# Patient Record
Sex: FEMALE | Race: WHITE | NOT HISPANIC OR LATINO | Employment: FULL TIME | URBAN - METROPOLITAN AREA
[De-identification: names, ages, dates, MRNs, and addresses within clinical notes are randomized per-mention and may not be internally consistent; named-entity substitution may affect disease eponyms.]

---

## 2020-08-11 ENCOUNTER — TELEPHONE (OUTPATIENT)
Dept: HEMATOLOGY ONCOLOGY | Facility: CLINIC | Age: 50
End: 2020-08-11

## 2020-08-11 NOTE — TELEPHONE ENCOUNTER
New Patient Encounter    New Patient Intake Form   Patient Details:  Aurea Avila  1970  [id number]    Background Information:  [address] starts by opening a telephone encounter and gathering the following information   Who is calling to schedule? If not self, relationship to patient? self   Referring Provider Ramiro Pump   What is the diagnosis? DCIS multiple sites   Is this diagnosis confirmed? Yes   When was the diagnosis? 6/2020   Is there a confirmed diagnosis from a biopsy/tissue reviewed by pathology? yes   Is patient aware of diagnosis? Yes   Is there a personal history and what kind? no   Is there a family history and what kind? Colon, esophageal    Reason for visit? New Diagnosis   Have you had any imaging or labs done? If so: when, where? yes  [facility]   Are records in [facility]? No / will be scanned   Was the patient told to bring a disk? yes   Does the patient smoke or Vape? no   If yes, how many packs or cartridges per day? Scheduling Information:   Preferred [place]:  Any     Are there any dates/time the patient cannot be seen? Miscellaneous: SLIDES REQUESTED  Pt will bring CD  Pt did have genetic testing that was negative  Called for report  Pt would like Dr Jeffrey Mccarthy for PS, referred by a friend   After completing the above information, please route to Financial Counselor and the appropriate Nurse Navigator for review

## 2020-08-13 NOTE — TELEPHONE ENCOUNTER
Pt has active [employer] ins effective 01/01/16  Marysol Chow pcp co-pay $20 specialist co-pay $40  called pt & went over the co-pays  & told her after her appt I will get the plan & verify the specific benefits that will pertain to the plan & call her back    gave her my contact info & she thanked me,,

## 2020-08-20 PROBLEM — D05.12 DUCTAL CARCINOMA IN SITU (DCIS) OF LEFT BREAST: Status: ACTIVE | Noted: 2020-08-20

## 2020-08-21 ENCOUNTER — TELEPHONE (OUTPATIENT)
Dept: SURGICAL ONCOLOGY | Facility: CLINIC | Age: 50
End: 2020-08-21

## 2020-08-21 NOTE — TELEPHONE ENCOUNTER
Patient is not currently experiencing any symptoms of fever, cough, shortness of breath, chills, repeated shaking with chills, muscle pain, headache, sore throat, or new loss of taste/smell  Patient has  been tested for COVID-19  SCREENING DONE PM  Patient has not been in contact with someone confirmed to have COVID-19  Reviewed visitor policy with patient  Reviewed masking policy with patient  Instructed patient to call office if any symptoms develop between now and appointment time

## 2020-08-24 ENCOUNTER — CONSULT (OUTPATIENT)
Dept: SURGICAL ONCOLOGY | Facility: CLINIC | Age: 50
End: 2020-08-24
Payer: COMMERCIAL

## 2020-08-24 VITALS
HEIGHT: 64 IN | WEIGHT: 178 LBS | DIASTOLIC BLOOD PRESSURE: 98 MMHG | HEART RATE: 90 BPM | SYSTOLIC BLOOD PRESSURE: 154 MMHG | BODY MASS INDEX: 30.39 KG/M2 | TEMPERATURE: 98.6 F | RESPIRATION RATE: 16 BRPM

## 2020-08-24 DIAGNOSIS — Z80.3 FAMILY HISTORY OF BREAST CANCER: ICD-10-CM

## 2020-08-24 DIAGNOSIS — Z13.71 BRCA GENE MUTATION NEGATIVE: ICD-10-CM

## 2020-08-24 DIAGNOSIS — D05.12 DUCTAL CARCINOMA IN SITU (DCIS) OF LEFT BREAST: Primary | ICD-10-CM

## 2020-08-24 PROCEDURE — 99245 OFF/OP CONSLTJ NEW/EST HI 55: CPT | Performed by: SURGERY

## 2020-08-24 RX ORDER — PRAVASTATIN SODIUM 10 MG
10 TABLET ORAL DAILY
COMMUNITY

## 2020-08-24 RX ORDER — PANTOPRAZOLE SODIUM 20 MG/1
20 TABLET, DELAYED RELEASE ORAL DAILY
COMMUNITY

## 2020-08-24 NOTE — PATIENT INSTRUCTIONS
Please contact the places you had ALL of your breast imaging at and request that they send a DICOM disk of those images to Dr January Johns  Specifically, we need your post-biopsy images for both biopsies as well as the MRI that you had done on 7/16/2020  These discs are needed as soon as possible in order to expedite your treatment plan  Also, if you had any breast imaging (ultrasounds, mammograms, MRIs) between 2015 and 2019, we need those images on a disc as well

## 2020-08-24 NOTE — PROGRESS NOTES
Surgical Oncology Consult Note       [address]  CANCER [facility] SURGICAL ONCOL[facility]Y [facility]  [address] [place] [address]    Meron Ramos  1970  [id number]  [address]  [facility] [id number]      Chief Complaint:     Chief Complaint   Patient presents with    Consult    Breast Cancer     Second opinion       Assessment and Plan:   Assessment/Plan   Patient presents for a 2nd opinion  She has at least multifocal DCIS in the left breast   Though her MRI reports suggests that it may be more extensive  Patient informs me she has decided she would like to have a mastectomy  We only have her original images we do not have her MRI or post MRI biopsy films  Patient would need to provide us her images if she wants nipple sparing which she does  There is some question regarding whether this would be reasonable  We will refer her to Plastic surgery  Oncology History:     Oncology History   Ductal carcinoma in situ (DCIS) of left breast   6/23/2020 Biopsy    Left breast stereotactic biopsy  Upper central breast, 5 cm from nipple  Ductal carcinoma in situ  Grade 2  ER 96  DC 94    Done at [facility]     7/16/2020 Observation    B/L Breast MRI (done at [facility] in [place])  2 cm non-mass enhancement in upper outer quadrant of left breast  MRI biopsy recommended  Right breast clear     8/2/2020 Genetic Testing    The following genes were evaluated: MALCOLM, BRCA1, BRCA2, CDH1, CHEK2, PALB2, PTEN, STK11, TP53  Additional genes were evaluated for a total of 47 genes analyzed  Negative result   No pathogenic sequence variants or deletions/dupllications identified  [facility] (done at [facility] in [place])     8/5/2020 Biopsy    Left breast MRI-guided biopsy  Upper outer quadrant  Ductal carcinoma in situ  Grade 3    Done at [facility] History of Present Illness: This is a 81-year-old woman who in June was noted to have microcalcifications in the left breast   She had a biopsy which demonstrated ductal carcinoma in situ, grade 2 this was in the upper central breast 5 cm from the nipple  The tumor was ER 96 VT 94% positive  The patient's subsequent had an MRI which demonstrated additional areas of non mass enhancement 1 of which was biopsied  The exact location of this is not clear the clip is apparently 4 cm away but I do not have these images  The report also suggested that there were other areas of less but worrisome non mass enhancement  Patient presents now for an opinion regarding further management  She has a maternal grandmother with breast cancer at the age of 61  The patient has been tested for Genetics and is negative according to the patient  Review of Systems:   Review of Systems   Constitutional: Negative for activity change, appetite change and fatigue  HENT: Negative  Eyes: Negative  Respiratory: Negative for cough, shortness of breath and wheezing  Cardiovascular: Negative for chest pain and leg swelling  Gastrointestinal: Negative  Endocrine: Negative  Genitourinary: Negative  Musculoskeletal:        No new changes or complaints of bone pain   Skin: Negative  Allergic/Immunologic: Negative  Neurological: Negative  Hematological: Negative  Psychiatric/Behavioral: Negative  Past Medical History:      Patient Active Problem List   Diagnosis    Ductal carcinoma in situ (DCIS) of left breast        Past Medical History:   Diagnosis Date    BRCA gene mutation negative 08/2020    47 Gene panel - [facility]      History reviewed  No pertinent surgical history       Family History   Problem Relation Age of Onset    Esophageal cancer Brother 61    Colon cancer Maternal Uncle         57-65's    Breast cancer Maternal Grandmother 61    Colon cancer Maternal Grandmother 61 Social History     Socioeconomic History    Marital status: /Civil Union     Spouse name: Not on file    Number of children: Not on file    Years of education: Not on file    Highest education level: Not on file   Occupational History    Not on file   Social Needs    Financial resource strain: Not on file    Food insecurity     Worry: Not on file     Inability: Not on file   [employer] needs     Medical: Not on file     Non-medical: Not on file   Tobacco Use    Smoking status: Never Smoker    Smokeless tobacco: Never Used   Substance and Sexual Activity    Alcohol use:  Yes     Alcohol/week: 6 0 standard drinks     Types: 6 Standard drinks or equivalent per week    Drug use: Not on file    Sexual activity: Not on file   Lifestyle    Physical activity     Days per week: Not on file     Minutes per session: Not on file    Stress: Not on file   Relationships    Social connections     Talks on phone: Not on file     Gets together: Not on file     Attends Oriental orthodox service: Not on file     Active member of club or organization: Not on file     Attends meetings of clubs or organizations: Not on file     Relationship status: Not on file    Intimate partner violence     Fear of current or ex partner: Not on file     Emotionally abused: Not on file     Physically abused: Not on file     Forced sexual activity: Not on file   Other Topics Concern    Not on file   Social History Narrative    Not on file        Current Outpatient Medications:     Fexofenadine HCl (ALLEGRA ALLERGY PO), Take by mouth daily, Disp: , Rfl:     pantoprazole (PROTONIX) 20 mg tablet, Take 20 mg by mouth daily, Disp: , Rfl:     pravastatin (PRAVACHOL) 10 mg tablet, Take 10 mg by mouth daily, Disp: , Rfl:     Probiotic Product (PROBIOTIC-10 PO), Take by mouth, Disp: , Rfl:      Allergies   Allergen Reactions    Peach Flavor Anaphylaxis    Tylenol [Acetaminophen] Anaphylaxis       Physical Exam:     Vitals: 08/24/20 0950   BP: 154/98   Pulse: 90   Resp: 16   Temp: 98 6 °F (37 °C)     Physical Exam  Vitals signs reviewed  Constitutional:       Appearance: She is well-developed  HENT:      Head: Normocephalic and atraumatic  Eyes:      Pupils: Pupils are equal, round, and reactive to light  Neck:      Musculoskeletal: Normal range of motion and neck supple  Thyroid: No thyromegaly  Vascular: No JVD  Trachea: No tracheal deviation  Cardiovascular:      Rate and Rhythm: Normal rate and regular rhythm  Heart sounds: Normal heart sounds  No murmur  No friction rub  No gallop  Pulmonary:      Effort: Pulmonary effort is normal  No respiratory distress  Breath sounds: Normal breath sounds  No wheezing or rales  Chest:      Comments: The right breast was examined in the sitting and supine position  There are no worrisome skin changes, tenderness, inverted nipple, nipple discharge, swelling, bleeding or evidence of a mass in any quadrant  Samia survey demonstrated no evidence of any clinically suspicious axillary, pectoral or paraclavicular lymph nodes  The left breast was examined in the sitting and supine position  There are no worrisome skin changes, tenderness, inverted nipple, nipple discharge, swelling, bleeding or evidence of a mass in any quadrant  Samia survey demonstrated no evidence of any clinically suspicious axillary, pectoral or paraclavicular lymph nodes  Abdominal:      General: There is no distension  Palpations: Abdomen is soft  There is no hepatomegaly or mass  Tenderness: There is no abdominal tenderness  There is no guarding or rebound  Musculoskeletal: Normal range of motion  General: No tenderness  Lymphadenopathy:      Cervical: No cervical adenopathy  Skin:     General: Skin is warm and dry  Findings: No erythema or rash  Neurological:      Mental Status: She is alert and oriented to person, place, and time        Cranial Nerves: No cranial nerve deficit  Psychiatric:         Behavior: Behavior normal          Results:   I reviewed her mammograms from June  She does have relatively significant calcifications in the upper outer quadrant  Discussion/Summary:   I reviewed the nature of ductal carcinoma in situ  I explained the difficulty in trying to determine whether breast conservation therapy would be possible  The patient subsequently inform me that she has opted for mastectomy  She would like it nipple sparing mastectomy though I would be difficult to clear without having her images  Will refer her to Plastic surgery  I will see her back shortly thereafter  Advance Care Planning/Advance Directives:  I discussed the disease status, treatment plans and follow-up with the patient